# Patient Record
Sex: FEMALE | Race: WHITE | NOT HISPANIC OR LATINO | Employment: UNEMPLOYED | URBAN - METROPOLITAN AREA
[De-identification: names, ages, dates, MRNs, and addresses within clinical notes are randomized per-mention and may not be internally consistent; named-entity substitution may affect disease eponyms.]

---

## 2025-04-24 ENCOUNTER — TELEMEDICINE (OUTPATIENT)
Dept: NEUROLOGY | Facility: HOSPITAL | Age: 45
End: 2025-04-24
Payer: MEDICARE

## 2025-04-24 DIAGNOSIS — G72.9 MYOPATHY: Primary | ICD-10-CM

## 2025-04-24 DIAGNOSIS — G90.A POTS (POSTURAL ORTHOSTATIC TACHYCARDIA SYNDROME): ICD-10-CM

## 2025-04-24 PROCEDURE — G2212 PROLONG OUTPT/OFFICE VIS: HCPCS | Performed by: PSYCHIATRY & NEUROLOGY

## 2025-04-24 PROCEDURE — 99215 OFFICE O/P EST HI 40 MIN: CPT | Performed by: PSYCHIATRY & NEUROLOGY

## 2025-04-24 NOTE — PROGRESS NOTES
[facility] AUTONOMIC PROGRAM    AUTONOMIC FOLLOW-UP VISIT      Alea López MD  Professor of Neurology  [facility]  Senior Attending Physician- [facility] [place]  Director, Autonomic Program  Medical Director, [facility] [place] [address]  Office: [phone]  Assistant: Gris Nayak (email: [email or web address])     Patient Information     Medical Record Number: [id number]   YOB: 1980    Home Address: [address] [place] [address]   Phone Number:  [phone]      Primary Care Physician: No primary care provider on file.    Referring Physician: No referring provider defined for this encounter.    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 Previous EMG tests in 2020 and 2024 showed a myopathy. Recent EMG in February at the [facility] [place] [facility] did not show any myopathy. Myositis panel showed a positive Ro 60 antibody which is consistent with her history of Sjogren's and an unidentified antibody which can be seen in patients with connective tissue disease.   She also had NT5C1 a antibody tested at [facility] were positive at 19 which can be seen in [employer] but it also in patients with autoimmune conditions such as lupus and Sjogren. She states that she had complete genome sequencing that did not indicate anything genetic. She had a PET scan that was negative.     She has previously been treated with several medication over the past 4 years. She tried IVIG in the past complicated by hemolytic anemia with her first trial 2021. She tried again in  2023 but had bad reaction to infusions with nausea, headache, and her vein not tolerating the infusion. She has tried high dose IV steroids for 3 days which made her muscle weakness worse. She tried Cytoxin 750mg for one dose back in 2023. She did a couple doses of Rituximab which she states made her neuropathy worse. She tried methotrexate but was unable to tolerate it due to stomach issues. She tried PLEX, but the physician who was managing it moved so this was not continued.     Over the past 18 months with more rapid worsening of symptoms in the last couple months, she has noticed muscle wasting throughout her body and weakness to the point of having difficulty with walking. She has increased muscle pain and burning in the areas that she is noticing this muscle wasting. She has several days where she will have shortness of breath, headache, blurry vision, and feeling like she is not getting oxygen to her brain.      Her neurologists in [place] recommended a muscle biopsy, which we agree with. She is hesitant because she is worried about further worsening of her muscle loss. Given her lack of response to treatments and positive NT5C1 antibody differentials would include [employer] vs. an autoimmune myopathy.     PLAN/RECOMMENDATIONS:   We will work coordinate a time for her to come to [place] soon to complete:  -Repeat Autonomic testing  -EMG  -Muscle Biopsy    Omar Westfall, APRN-CNP    HPI  Bouchra Moy is a 45 y.o. y/o  female presenting to the  Autonomic Program for a follow-up on her autoimmune autonomic neuropathy. We last saw her virtually on 5/5/2022.     Interval History details   Over the past 18 months she has started having severe muscle wasting all over her body. It started in the right leg, but now has wasting in her face, arms, chest, and left leg. She has noticed worsening weakness with difficulty getting up stairs, foot drop and cramping at night, and within the past 2 weeks she has started  "having difficulty walking. She can feel burning and severe pain in the areas that she is noticing the muscle wasting. She \"feels like her muscles are grabbing her.\" She is having weakness with chewing.     Another concerning symptoms that has gotten worse over the past few months is difficulty breathing. She will have episodes of difficulty getting air into her lungs for a few days. She feels like the oxygen is not getting to her brain. She has blurry vision, headaches, palpitations with these episodes.     Over the past 3 years she saw a specialist in [place] to assess possible fat wasting, but what was discovered was that she was also having muscle loss. 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 She saw genetics that did whole genome sequencing which was negative. A Myositis panel showed a positive Ro 60 antibody which is consistent with her history of Sjogren's and an unidentified antibody which can be seen in patients with connective tissue disease. She also had NT5C1 a antibody tested at [facility] were positive at 19 which can be seen in [employer]. At the end of last year she saw neuromuscular at the [facility] which repeat EMG did not show a myopathy. She just had an MRI of the thigh muscles and it is recommended that she get a muscle biopsy. She has concerns about this making her symptoms worse and increasing her muscle loss. She also had a PET scan which was negative.     Bone marrow biopsy showed decreased iron uptake. Hematologist wants to do iron infusions which she ibanez tart next week.     Physical Exam:  Limited due to virtual nature of the visit.    Hard to determine over video, but there is muscle loss and carving noted in the forearms. Right thigh may be smaller then left thigh.     I spent 60 minutes with the patient, at least 50% of which were spent on treatment management and education.    Alea López MD    "

## 2025-04-28 ENCOUNTER — PREP FOR PROCEDURE (OUTPATIENT)
Dept: SURGICAL ONCOLOGY | Facility: HOSPITAL | Age: 45
End: 2025-04-28

## 2025-04-28 ENCOUNTER — APPOINTMENT (OUTPATIENT)
Dept: SURGERY | Facility: CLINIC | Age: 45
End: 2025-04-28
Payer: MEDICARE

## 2025-04-28 ENCOUNTER — HOSPITAL ENCOUNTER (OUTPATIENT)
Facility: HOSPITAL | Age: 45
Setting detail: OUTPATIENT SURGERY
End: 2025-04-28
Attending: SURGERY | Admitting: SURGERY
Payer: MEDICARE

## 2025-04-28 DIAGNOSIS — Z01.818 PREOPERATIVE TESTING: ICD-10-CM

## 2025-04-28 DIAGNOSIS — Z00.00 HEALTHCARE MAINTENANCE: ICD-10-CM

## 2025-04-28 DIAGNOSIS — G72.9 MYOPATHY: Primary | ICD-10-CM

## 2025-04-28 DIAGNOSIS — M79.10 MYALGIA: ICD-10-CM

## 2025-04-28 DIAGNOSIS — R23.3 EASY BRUISING: ICD-10-CM

## 2025-04-28 PROCEDURE — 99205 OFFICE O/P NEW HI 60 MIN: CPT | Performed by: SURGERY

## 2025-04-28 ASSESSMENT — ENCOUNTER SYMPTOMS
ENDOCRINE NEGATIVE: 1
WEAKNESS: 1
PSYCHIATRIC NEGATIVE: 1
RESPIRATORY NEGATIVE: 1
EYES NEGATIVE: 1
CARDIOVASCULAR NEGATIVE: 1
FATIGUE: 1

## 2025-04-28 NOTE — PROGRESS NOTES
Subjective   Patient ID: Bouchra Moy is a 45 y.o. female who presents for surgical consultation for muscle biopsy.    HPI I saw Mrs. Moy by means of virtual visit today.  She gave her consent for a virtual consultation today.  She lives in the [facility] [place] and is at her home.  She will be traveling to [facility] to meet with her neurologist in May.  They had specifically requested that we get her ready for surgery for a muscle biopsy on Wednesday, May 21 and she needed to be seen in advance of that.  Therefore virtual visit was the only option.    She said that she has had some longstanding progressive myopathy issues.  She also has Sjogren syndrome and POTS syndrome as well as other autoimmune characteristics that are suspicious for autoimmune neuropathy and myopathy.  She has a rheumatologist and neurologist in the [facility] in [place].  She is also been to [place] and had EMG testing done there in the past.  She will be sending that information on to Dr. Morgan of neurology at [facility] who requested me to do the biopsy for her.  His full note is listed below.    She mainly reports worsening weakness greater on the right side than the left side but diffuse atrophy which is bilateral.    Family history no neurologic or neuromuscular disorders.    Telemedicine    4/24/2025  [facility]       Alea Morgan MD  Neurology Myopathy +1 more  Dx     Progress Notes  Omar Westfall APRN-CNP (Nurse Practitioner)  Neurology  Cosigned by: Alea Morgan MD at 4/24/2025  3:53 PM   [facility] AUTONOMIC PROGRAM     AUTONOMIC FOLLOW-UP VISIT        Alea López MD  Professor of Neurology  [facility]  Senior Attending Physician- [facility] [place]  Director, Autonomic Program  Medical Director, [facility] [place] [address]  Office:  [phone]  Assistant: Gris Nayak (email: [email or web address])      Patient Information      Medical Record Number: [id number]   YOB: 1980     Home Address: [address] [place] [address]   Phone Number:  [phone]        Primary Care Physician: No primary care provider on file.     Referring Physician: No referring provider defined for this encounter.     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 Previous EMG tests in 2020 and 2024 showed a myopathy. Recent EMG in February at the [facility] did not show any myopathy. Myositis panel showed a positive Ro 60 antibody which is consistent with her history of Sjogren's and an unidentified antibody which can be seen in patients with connective tissue disease.   She also had NT5C1 a antibody tested at [facility] were positive at 19 which can be seen in [employer] but it also in patients with autoimmune conditions such as lupus and Sjogren. She states that she had complete genome sequencing that did not indicate anything genetic. She had a PET scan that was negative.      She has previously been treated with several medication over the past 4 years. She tried IVIG in the past complicated by hemolytic anemia with her first trial 2021. She tried again in 2023 but had bad reaction to infusions with nausea, headache, and her vein not tolerating the infusion. She has tried high dose IV steroids for 3 days which made her muscle weakness worse. She tried Cytoxin 750mg for one dose back in 2023. She did a couple doses of Rituximab which she states made her neuropathy worse. She tried methotrexate but was unable  "to tolerate it due to stomach issues. She tried PLEX, but the physician who was managing it moved so this was not continued.      Over the past 18 months with more rapid worsening of symptoms in the last couple months, she has noticed muscle wasting throughout her body and weakness to the point of having difficulty with walking. She has increased muscle pain and burning in the areas that she is noticing this muscle wasting. She has several days where she will have shortness of breath, headache, blurry vision, and feeling like she is not getting oxygen to her brain.       Her neurologists in [place] recommended a muscle biopsy, which we agree with. She is hesitant because she is worried about further worsening of her muscle loss. Given her lack of response to treatments and positive NT5C1 antibody differentials would include [employer] vs. an autoimmune myopathy.      PLAN/RECOMMENDATIONS:   We will work coordinate a time for her to come to [place] soon to complete:  -Repeat Autonomic testing  -EMG  -Muscle Biopsy     Omar Westfall, APRN-CNP     HPI  Bouchra Moy is a 45 y.o. y/o  female presenting to the  Autonomic Program for a follow-up on her autoimmune autonomic neuropathy. We last saw her virtually on 5/5/2022.      Interval History details   Over the past 18 months she has started having severe muscle wasting all over her body. It started in the right leg, but now has wasting in her face, arms, chest, and left leg. She has noticed worsening weakness with difficulty getting up stairs, foot drop and cramping at night, and within the past 2 weeks she has started having difficulty walking. She can feel burning and severe pain in the areas that she is noticing the muscle wasting. She \"feels like her muscles are grabbing her.\" She is having weakness with chewing.      Another concerning symptoms that has gotten worse over the past few months is difficulty breathing. She will have episodes of difficulty getting " air into her lungs for a few days. She feels like the oxygen is not getting to her brain. She has blurry vision, headaches, palpitations with these episodes.      Over the past 3 years she saw a specialist in [place] to assess possible fat wasting, but what was discovered was that she was also having muscle loss. 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 She saw genetics that did whole genome sequencing which was negative. A Myositis panel showed a positive Ro 60 antibody which is consistent with her history of Sjogren's and an unidentified antibody which can be seen in patients with connective tissue disease. She also had NT5C1 a antibody tested at [facility] were positive at 19 which can be seen in [employer]. At the end of last year she saw neuromuscular at the [facility] which repeat EMG did not show a myopathy. She just had an MRI of the thigh muscles and it is recommended that she get a muscle biopsy. She has concerns about this making her symptoms worse and increasing her muscle loss. She also had a PET scan which was negative.      Bone marrow biopsy showed decreased iron uptake. Hematologist wants to do iron infusions which she ibanez tart next week.      Physical Exam:  Limited due to virtual nature of the visit.     Hard to determine over video, but there is muscle loss and carving noted in the forearms. Right thigh may be smaller then left thigh.      I spent 60 minutes with the patient, at least 50% of which were spent on treatment management and education.     Alea López MD          Review of Systems   Constitutional:  Positive for fatigue.   HENT: Negative.     Eyes: Negative.    Respiratory: Negative.     Cardiovascular: Negative.    Endocrine: Negative.    Musculoskeletal:         Significant muscle wasting   Skin: Negative.    Neurological:  Positive for weakness.   Psychiatric/Behavioral: Negative.         Objective   Physical Exam  Vitals reviewed.    Constitutional:       Appearance: Normal appearance.      Comments: This was a virtual visit.  Therefore no full physical exam could be accomplished.  Patient did not have any ill appearance during our video conference today.   Neurological:      Mental Status: She is alert.         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  She has been followed at a combination of hospitals in [place] where she resides as well as as in [facility].    Dr. Morgan of neurology [facility] has recently been working with her.  He is bringing her here to [place] for some additional testing in May and requested that we do a muscle biopsy for her.    Plan    I had a good discussion with her by video conferencing and virtual visit today.  We discussed the operation itself for muscle biopsy.  I told her that I had discussed with her neurologist which muscle to biopsy.  At this time he has not made a formal decision.  He is planning on repeating her EMG testing here at [facility] during her stay.  That would determine which muscle he wants me to get biopsy for her.    I talked about the actual incision closure general recovery.  She would like to fly back home on the day after surgery which I think is reasonable.  She said someone would be accompanying her and could assist her with her suitcase.  I think that would be important for her to definitely have someone with her.    I had my nurse order blood work and EKG for her to get done in advance of her surgery.    Currently I will be scheduling her surgery at [facility] for a muscle biopsy on Wednesday, May 21, 2025.  Her surgery will likely be first thing in the morning at 815.  She would need to be to the hospital by 615.  Therefore any neurology testing that she needs accomplished will have to be done in advance of that.  I will need to know by Tuesday  afternoon on May 20 which muscle and side left or right that her neurologist would want us to biopsy.    She was comfortable with this at the end of our visit.  She is concerned about having a biopsy done and making her myopathy worse.  We discussed that she will certainly be sore afterwards but generally does not have a long-term impact on any additional weakness in the site.         Con Mariee MD 04/28/25 2:06 PM

## 2025-04-28 NOTE — H&P
Patient ID: Bouchra Moy is a 45 y.o. female who presents for surgical consultation for muscle biopsy.     HPI I saw Mrs. Moy by means of virtual visit today.  She gave her consent for a virtual consultation today.  She lives in the [facility] and is at her home.  She will be traveling to [facility] to meet with her neurologist in May.  They had specifically requested that we get her ready for surgery for a muscle biopsy on Wednesday, May 21 and she needed to be seen in advance of that.  Therefore virtual visit was the only option.     She said that she has had some longstanding progressive myopathy issues.  She also has Sjogren syndrome and POTS syndrome as well as other autoimmune characteristics that are suspicious for autoimmune neuropathy and myopathy.  She has a rheumatologist and neurologist in the [facility] in [place].  She is also been to [place] and had EMG testing done there in the past.  She will be sending that information on to Dr. Morgan of neurology at [facility] who requested me to do the biopsy for her.  His full note is listed below.     She mainly reports worsening weakness greater on the right side than the left side but diffuse atrophy which is bilateral.     Family history no neurologic or neuromuscular disorders.     Telemedicine    4/24/2025  [facility]       Alea Morgan MD  Neurology Myopathy +1 more  Dx      Progress Notes  Omar Westfall APRN-CNP (Nurse Practitioner)  Neurology  Cosigned by: Alea Morgan MD at 4/24/2025  3:53 PM   [facility] AUTONOMIC PROGRAM     AUTONOMIC FOLLOW-UP VISIT        Alea López MD  Professor of Neurology  [facility]  Senior Attending Physician- [facility]  Director, Autonomic Program  Medical Director, [facility] [place] [address]  Office:  [phone]  Assistant: Gris Nayak (email: [email or web address])      Patient Information      Medical Record Number: [id number]   YOB: 1980     Home Address: [address] [place] [address]   Phone Number:  [phone]        Primary Care Physician: No primary care provider on file.     Referring Physician: No referring provider defined for this encounter.     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 Previous EMG tests in 2020 and 2024 showed a myopathy. Recent EMG in February at the [facility] did not show any myopathy. Myositis panel showed a positive Ro 60 antibody which is consistent with her history of Sjogren's and an unidentified antibody which can be seen in patients with connective tissue disease.   She also had NT5C1 a antibody tested at [facility] were positive at 19 which can be seen in [employer] but it also in patients with autoimmune conditions such as lupus and Sjogren. She states that she had complete genome sequencing that did not indicate anything genetic. She had a PET scan that was negative.      She has previously been treated with several medication over the past 4 years. She tried IVIG in the past complicated by hemolytic anemia with her first trial 2021. She tried again in 2023 but had bad reaction to infusions with nausea, headache, and her vein not tolerating the infusion. She has tried high dose IV steroids for 3 days which made her muscle weakness worse. She tried Cytoxin 750mg for one dose back in 2023. She did a couple doses of Rituximab which she states made her neuropathy worse. She tried methotrexate but was unable  "to tolerate it due to stomach issues. She tried PLEX, but the physician who was managing it moved so this was not continued.      Over the past 18 months with more rapid worsening of symptoms in the last couple months, she has noticed muscle wasting throughout her body and weakness to the point of having difficulty with walking. She has increased muscle pain and burning in the areas that she is noticing this muscle wasting. She has several days where she will have shortness of breath, headache, blurry vision, and feeling like she is not getting oxygen to her brain.       Her neurologists in [place] recommended a muscle biopsy, which we agree with. She is hesitant because she is worried about further worsening of her muscle loss. Given her lack of response to treatments and positive NT5C1 antibody differentials would include [employer] vs. an autoimmune myopathy.      PLAN/RECOMMENDATIONS:   We will work coordinate a time for her to come to [place] soon to complete:  -Repeat Autonomic testing  -EMG  -Muscle Biopsy     Omar Westfall, APRN-CNP     HPI  Bouchra Moy is a 45 y.o. y/o  female presenting to the  Autonomic Program for a follow-up on her autoimmune autonomic neuropathy. We last saw her virtually on 5/5/2022.      Interval History details   Over the past 18 months she has started having severe muscle wasting all over her body. It started in the right leg, but now has wasting in her face, arms, chest, and left leg. She has noticed worsening weakness with difficulty getting up stairs, foot drop and cramping at night, and within the past 2 weeks she has started having difficulty walking. She can feel burning and severe pain in the areas that she is noticing the muscle wasting. She \"feels like her muscles are grabbing her.\" She is having weakness with chewing.      Another concerning symptoms that has gotten worse over the past few months is difficulty breathing. She will have episodes of difficulty getting " air into her lungs for a few days. She feels like the oxygen is not getting to her brain. She has blurry vision, headaches, palpitations with these episodes.      Over the past 3 years she saw a specialist in [place] to assess possible fat wasting, but what was discovered was that she was also having muscle loss. 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 She saw genetics that did whole genome sequencing which was negative. A Myositis panel showed a positive Ro 60 antibody which is consistent with her history of Sjogren's and an unidentified antibody which can be seen in patients with connective tissue disease. She also had NT5C1 a antibody tested at [facility] were positive at 19 which can be seen in [employer]. At the end of last year she saw neuromuscular at the [facility] which repeat EMG did not show a myopathy. She just had an MRI of the thigh muscles and it is recommended that she get a muscle biopsy. She has concerns about this making her symptoms worse and increasing her muscle loss. She also had a PET scan which was negative.      Bone marrow biopsy showed decreased iron uptake. Hematologist wants to do iron infusions which she ibanez tart next week.      Physical Exam:  Limited due to virtual nature of the visit.     Hard to determine over video, but there is muscle loss and carving noted in the forearms. Right thigh may be smaller then left thigh.      I spent 60 minutes with the patient, at least 50% of which were spent on treatment management and education.     Alea López MD            Review of Systems   Constitutional:  Positive for fatigue.   HENT: Negative.     Eyes: Negative.    Respiratory: Negative.     Cardiovascular: Negative.    Endocrine: Negative.    Musculoskeletal:         Significant muscle wasting   Skin: Negative.    Neurological:  Positive for weakness.   Psychiatric/Behavioral: Negative.           Objective  Physical Exam  Vitals reviewed.    Constitutional:       Appearance: Normal appearance.      Comments: This was a virtual visit.  Therefore no full physical exam could be accomplished.  Patient did not have any ill appearance during our video conference today.   Neurological:      Mental Status: She is alert.            Assessment/Plan  Mrs. Moy has had progressive myopathy and muscle weakness for several years now.  She has been followed at a combination of hospitals in [place] where she resides as well as as in [facility].     Dr. Morgan of neurology [facility] has recently been working with her.  He is bringing her here to [place] for some additional testing in May and requested that we do a muscle biopsy for her.     Plan     I had a good discussion with her by video conferencing and virtual visit today.  We discussed the operation itself for muscle biopsy.  I told her that I had discussed with her neurologist which muscle to biopsy.  At this time he has not made a formal decision.  He is planning on repeating her EMG testing here at [facility] during her stay.  That would determine which muscle he wants me to get biopsy for her.     I talked about the actual incision closure general recovery.  She would like to fly back home on the day after surgery which I think is reasonable.  She said someone would be accompanying her and could assist her with her suitcase.  I think that would be important for her to definitely have someone with her.     I had my nurse order blood work and EKG for her to get done in advance of her surgery.     Currently I will be scheduling her surgery at [facility] for a muscle biopsy on Wednesday, May 21, 2025.  Her surgery will likely be first thing in the morning at 815.  She would need to be to the hospital by 615.  Therefore any neurology testing that she needs accomplished will have to be done in advance of that.  I will need to know by Tuesday  afternoon on May 20 which muscle and side left or right that her neurologist would want us to biopsy.     She was comfortable with this at the end of our visit.  She is concerned about having a biopsy done and making her myopathy worse.  We discussed that she will certainly be sore afterwards but generally does not have a long-term impact on any additional weakness in the site.

## 2025-04-30 ENCOUNTER — TELEPHONE (OUTPATIENT)
Dept: SURGERY | Facility: CLINIC | Age: 45
End: 2025-04-30
Payer: MEDICARE

## 2025-04-30 NOTE — TELEPHONE ENCOUNTER
Call to patient. No answer. Left voicemail message confirming tentative OR date 5/21/25. Notified patient she needs to complete labs and an ECG before the date of surgery. Orders in EMR.  Sent email to address on file with pre-op instructions. Callback requested and number provided.   Cheek Interpolation Flap Text: A decision was made to reconstruct the defect utilizing an interpolation axial flap and a staged reconstruction.  A telfa template was made of the defect.  This telfa template was then used to outline the Cheek Interpolation flap.  The donor area for the pedicle flap was then injected with anesthesia.  The flap was excised through the skin and subcutaneous tissue down to the layer of the underlying musculature.  The interpolation flap was carefully excised within this deep plane to maintain its blood supply.  The edges of the donor site were undermined.   The donor site was closed in a primary fashion.  The pedicle was then rotated into position and sutured.  Once the tube was sutured into place, adequate blood supply was confirmed with blanching and refill.  The pedicle was then wrapped with xeroform gauze and dressed appropriately with a telfa and gauze bandage to ensure continued blood supply and protect the attached pedicle.

## 2025-05-20 ENCOUNTER — HOSPITAL ENCOUNTER (OUTPATIENT)
Dept: NEUROLOGY | Facility: HOSPITAL | Age: 45
End: 2025-05-20
Payer: MEDICARE

## 2025-05-20 ENCOUNTER — APPOINTMENT (OUTPATIENT)
Dept: NEUROLOGY | Facility: HOSPITAL | Age: 45
End: 2025-05-20
Payer: MEDICARE